# Patient Record
Sex: FEMALE | Race: WHITE
[De-identification: names, ages, dates, MRNs, and addresses within clinical notes are randomized per-mention and may not be internally consistent; named-entity substitution may affect disease eponyms.]

---

## 2022-08-19 ENCOUNTER — HOSPITAL ENCOUNTER (EMERGENCY)
Dept: HOSPITAL 76 - ED | Age: 52
Discharge: HOME | End: 2022-08-19
Payer: COMMERCIAL

## 2022-08-19 VITALS — SYSTOLIC BLOOD PRESSURE: 130 MMHG | DIASTOLIC BLOOD PRESSURE: 80 MMHG

## 2022-08-19 DIAGNOSIS — T81.49XA: Primary | ICD-10-CM

## 2022-08-19 LAB
ALBUMIN DIAFP-MCNC: 3.8 G/DL (ref 3.2–5.5)
ALBUMIN/GLOB SERPL: 1 {RATIO} (ref 1–2.2)
ALP SERPL-CCNC: 57 IU/L (ref 42–121)
ALT SERPL W P-5'-P-CCNC: 27 IU/L (ref 10–60)
ANION GAP SERPL CALCULATED.4IONS-SCNC: 11 MMOL/L (ref 6–13)
AST SERPL W P-5'-P-CCNC: 20 IU/L (ref 10–42)
BASOPHILS NFR BLD AUTO: 0.1 10^3/UL (ref 0–0.1)
BASOPHILS NFR BLD AUTO: 0.5 %
BILIRUB BLD-MCNC: 0.5 MG/DL (ref 0.2–1)
BUN SERPL-MCNC: 14 MG/DL (ref 6–20)
CALCIUM UR-MCNC: 9.4 MG/DL (ref 8.5–10.3)
CHLORIDE SERPL-SCNC: 105 MMOL/L (ref 101–111)
CK SERPL-CCNC: 42 IU/L (ref 22–269)
CO2 SERPL-SCNC: 24 MMOL/L (ref 21–32)
CREAT SERPLBLD-SCNC: 0.8 MG/DL (ref 0.4–1)
EOSINOPHIL # BLD AUTO: 0.2 10^3/UL (ref 0–0.7)
EOSINOPHIL NFR BLD AUTO: 1.3 %
ERYTHROCYTE [DISTWIDTH] IN BLOOD BY AUTOMATED COUNT: 15.2 % (ref 12–15)
GFRSERPLBLD MDRD-ARVRAT: 76 ML/MIN/{1.73_M2} (ref 89–?)
GLOBULIN SER-MCNC: 3.9 G/DL (ref 2.1–4.2)
GLUCOSE SERPL-MCNC: 115 MG/DL (ref 70–100)
HCT VFR BLD AUTO: 37.6 % (ref 37–47)
HGB UR QL STRIP: 12.2 G/DL (ref 12–16)
LIPASE SERPL-CCNC: 28 U/L (ref 22–51)
LYMPHOCYTES # SPEC AUTO: 1.6 10^3/UL (ref 1.5–3.5)
LYMPHOCYTES NFR BLD AUTO: 9.6 %
MCH RBC QN AUTO: 27.7 PG (ref 27–31)
MCHC RBC AUTO-ENTMCNC: 32.4 G/DL (ref 32–36)
MCV RBC AUTO: 85.5 FL (ref 81–99)
MONOCYTES # BLD AUTO: 1.2 10^3/UL (ref 0–1)
MONOCYTES NFR BLD AUTO: 7.2 %
NEUTROPHILS # BLD AUTO: 13.3 10^3/UL (ref 1.5–6.6)
NEUTROPHILS # SNV AUTO: 16.6 X10^3/UL (ref 4.8–10.8)
NEUTROPHILS NFR BLD AUTO: 80 %
NRBC # BLD AUTO: 0 /100WBC
NRBC # BLD AUTO: 0 X10^3/UL
PDW BLD AUTO: 11.1 FL (ref 7.9–10.8)
PLATELET # BLD: 375 10^3/UL (ref 130–450)
POTASSIUM SERPL-SCNC: 4.1 MMOL/L (ref 3.5–5)
PROT SPEC-MCNC: 7.7 G/DL (ref 6.7–8.2)
RBC MAR: 4.4 10^6/UL (ref 4.2–5.4)
SODIUM SERPLBLD-SCNC: 140 MMOL/L (ref 135–145)

## 2022-08-19 PROCEDURE — 99283 EMERGENCY DEPT VISIT LOW MDM: CPT

## 2022-08-19 PROCEDURE — 82550 ASSAY OF CK (CPK): CPT

## 2022-08-19 PROCEDURE — 83690 ASSAY OF LIPASE: CPT

## 2022-08-19 PROCEDURE — 36415 COLL VENOUS BLD VENIPUNCTURE: CPT

## 2022-08-19 PROCEDURE — 96366 THER/PROPH/DIAG IV INF ADDON: CPT

## 2022-08-19 PROCEDURE — 80053 COMPREHEN METABOLIC PANEL: CPT

## 2022-08-19 PROCEDURE — 10060 I&D ABSCESS SIMPLE/SINGLE: CPT

## 2022-08-19 PROCEDURE — 74176 CT ABD & PELVIS W/O CONTRAST: CPT

## 2022-08-19 PROCEDURE — 96365 THER/PROPH/DIAG IV INF INIT: CPT

## 2022-08-19 PROCEDURE — 83605 ASSAY OF LACTIC ACID: CPT

## 2022-08-19 PROCEDURE — 85025 COMPLETE CBC W/AUTO DIFF WBC: CPT

## 2022-08-19 PROCEDURE — 99284 EMERGENCY DEPT VISIT MOD MDM: CPT

## 2022-08-19 NOTE — EXTERNAL MEDICAL SUMMARY RPT
Continuity of Care Document

                           Created on:2022



Patient:TAHIR DOHERTY

Sex:Female

:1970

External Reference #:[id number]





Demographics







                          Address                   [address] [place] [address]

 

                          Phone                     Unavailable

 

                          Preferred Language        Unknown

 

                          Marital Status            Unknown

 

                          Buddhism Affiliation     Unknown

 

                          Race                      Unknown

 

                          Ethnic Group              Unknown









Author







                          Organization              [facility]

 

                          Address                    [place] [address]

 

                          Phone                     [phone]









Allergies

No information.



Encounters

No information.



Functional Status

No information.



Immunizations

No information.



Medications







                     date                description         facility

 

                     [id number]  tramadol            [facility]

[place]

 

                     [id number]+0000  propranolol         [facility]

[place]

 

                     [id number]  sulfamethoxazole-trimethoprim  [facility]

[place]

 

                     [id number]  tramadol            [facility]

[place]

 

                     [id number]  sulfamethoxazole-trimethoprim  [facility]

[place]

 

                     [id number]  propranolol         [facility]

[place]







Problems

No information.



Procedures







                     date                description         facility

 

                     [id number]+0000  Visit Code Hold     [facility]



                                                            Services [place]

 

                     [id number]  Visit Code Hold     [facility]



                                                            Services [place]







Results/Labs

No information.



Social History







                     date                description         facility

 

                     [id number]+0000  Never smoker        [facility]



                                                            [facility] [place]

 

                     [id number]+0000  Never smoker        [facility]



                                                            [facility] [place]







Vital Signs







                 date            measurement     value           units









              [id number]+0000  BMI          BMI          39.52        kg/m2

 

              [id number]+0000  BP_diastolic  BP_diastolic  89           mm[H

g]

 

              [id number]+0000  BP_systolic  BP_systolic  146          mm[Hg]

 

              [id number]+0000  heart_rate   heart_rate   114          /min

 

              [id number]+0000  height_metric  height_metric  167.64       cm

 

              [id number]+0000  height_standard  height_standard  66         

  in

 

              [id number]+0000  respiration_rate  respiration_rate  16       

    /min

 

              [id number]+0000  temperature_metric  temperature_metric  36.33

        C

 

              [id number]+0000  temperature_standard  temperature_standard  9

7.4         F

 

              [id number]+0000  weight_metric  weight_metric  110.68       kg

 

              [id number]+0000  weight_standard  weight_standard  244        

  lb

 

              [id number]+0000  BMI          BMI          39.43        kg/m2

 

              73129320135847+0000  BP_diastolic  BP_diastolic  64           mm[H

g]

 

              30392741303365+0000  BP_systolic  BP_systolic  108          mm[Hg]

 

              34318551745304+0000  heart_rate   heart_rate   88           /min

 

              61079733298138+0000  height_metric  height_metric  167.64       cm

 

              59649634791214+0000  height_standard  height_standard  66         

  in

 

              83194855773075+0000  respiration_rate  respiration_rate  16       

    /min

 

              09642160279517+0000  temperature_metric  temperature_metric  37.17

        C

 

              [id number]+0000  temperature_standard  temperature_standard  9

8.9         F

 

              39463764816829+0000  weight_metric  weight_metric  110.4        kg

 

              [id number]+0000  weight_standard  weight_standard  243.4      

  lb

## 2022-08-19 NOTE — ED PHYSICIAN DOCUMENTATION
PD HPI SKIN





- Stated complaint


Stated Complaint: ABSCESS





- Chief complaint


Chief Complaint: Wound





- History obtained from


History obtained from: Patient





- History of Present Illness


Timing - onset: How many days ago (2-3)


Timing - duration: Days


Timing - details: Abrupt onset, Still present


Location: Abdomen


Quality / character: Painful, Discolored (red), Swelling, Draining (was not 

draining initially and seen in [facility], with I&D done, Rx Bactrim and given 

"shot of antibiotics" in clinic. Back to [facility] yesterday for packing change. 

She states still having redness and some drainage, but tender swelling persists 

below the I&D spot.)


Associated symptoms: No: Fever, Myalgias, N/V/D


Contributing factors: Unknown


Recently seen: Clinic (Walk [facility] clinic 2 days ago with I&D and yesterday with 

packing change.)





PD PAST MEDICAL HISTORY





- Allergies


Allergies/Adverse Reactions: 


                                    Allergies











Allergy/AdvReac Type Severity Reaction Status Date / Time


 


No Known Drug Allergies Allergy   Verified 08/19/22 08:50














Results





- Vitals


Vitals: 


                               Vital Signs - 24 hr











  08/19/22 08/19/22





  08:48 13:38


 


Temperature 36.0 C L 36.5 C


 


Heart Rate 88 80


 


Respiratory 16 16





Rate  


 


Blood Pressure 149/82 H 130/80


 


O2 Saturation 99 98








                                     Oxygen











O2 Source                      Room air

















- Labs


Labs: 


                                Laboratory Tests











  08/19/22 08/19/22 08/19/22





  10:27 10:27 10:27


 


WBC  16.6 H  


 


RBC  4.40  


 


Hgb  12.2  


 


Hct  37.6  


 


MCV  85.5  


 


MCH  27.7  


 


MCHC  32.4  


 


RDW  15.2 H  


 


Plt Count  375  


 


MPV  11.1 H  


 


Neut # (Auto)  13.3 H  


 


Lymph # (Auto)  1.6  


 


Mono # (Auto)  1.2 H  


 


Eos # (Auto)  0.2  


 


Baso # (Auto)  0.1  


 


Absolute Nucleated RBC  0.00  


 


Nucleated RBC %  0.0  


 


Sodium   140 


 


Potassium   4.1 


 


Chloride   105 


 


Carbon Dioxide   24 


 


Anion Gap   11.0 


 


BUN   14 


 


Creatinine   0.8 


 


Estimated GFR (MDRD)   76 L 


 


Glucose   115 H 


 


Lactic Acid    0.9


 


Calcium   9.4 


 


Total Bilirubin   0.5 


 


AST   20 


 


ALT   27 


 


Alkaline Phosphatase   57 


 


Total Creatine Kinase   42 


 


Total Protein   7.7 


 


Albumin   3.8 


 


Globulin   3.9 


 


Albumin/Globulin Ratio   1.0 


 


Lipase   28 














- Rads (name of study)


  ** abd/pelvic CT


Radiology: Prelim report reviewed (induration soft tissue. Local fluid 

collection 2-3 cm size. ), See rad report





Procedures





- Abscess I&D (location)


  ** lower abd pannus


Preparation: Lidocaine 1%, With epi, Other (confirmed with CT)


Incision: Incised with scalpel, Purulent drainage


Other: Pt tolerated well, Other (she was already on Bactrim for past 1 1/2 days.

Given dose of Vanco here to supplement.)





PD MEDICAL DECISION MAKING





- ED course


Complexity details: reviewed results (CT showing fluid collection about 2-3 cm 

near area of incision. ), re-evaluated patient (had persistent fluid collection 

so did second I&D and got purulence out separate area. ), considered 

differential (has abscess that is still draining and with persistent surround 

redness and tenderness. CT done and showed persistent fluid collection. Initial 

culture was showing Staph. by time of end of ER visit, sensitivity report out 

and sensitive to Bactrim and vanco.), d/w patient


ED course: 





there is a lot of packing in the wound that was I&D'd. I removed it as I felt 

too much packing to allow drainage. More purulence out after packing removal. 

There is still area of firm/swelling below and midline of that, with CT showing 

persistent fluid collection, so second I&D done with more drainage. I believe 

with that as well as continued abx orally (since only on it for 1 1/2 days so 

far) the infection with improve from here. 





Departure





- Departure


Disposition: 01 Home, Self Care


Clinical Impression: 


 Abscess re-check





Condition: Stable


Record reviewed to determine appropriate education?: Yes


Instructions:  ED Abscess IandD


Follow-Up: 


JENELLE MERCHANT III, MD [Primary Care Provider] - 


Comments: 


Warm moist compresses or soaks to the area to help promote drainage further.  

Soft palpation around the incision sites to promote drainage periodically as 

well.





The culture result actually did result just an hour or so ago showing 

methicillin-resistant staph aureus which is a likely expected because for an 

abscess like this.  It did show sensitivity to sulfa medications like the one 

you are prescribed.





We did give an added dose of IV antibiotic vancomycin here to help bolster that.





We did an second drainage site to promote drainage that was still seen localized

on the CT scan.





The CT scan did not show any deep areas of abscess or inflammation.





At this point I would continue with the previous antibiotic and see how it does 

over the next several days.  Return if or follow-up with your primary care if 

persistent worsening or general symptoms such as fever vomiting etc.


Discharge Date/Time: 08/19/22 13:38

## 2022-08-19 NOTE — CT REPORT
PROCEDURE:  Abdomen/Pelvis WO

 

INDICATIONS:  lower abd abscess worsening

 

TECHNIQUE:  

Noncontrast 5 mm thick sections acquired from the diaphragms to the symphysis.  5 mm coronal and sagi
ttal reformats were then performed.  For radiation dose reduction, the following was used:  automated
 exposure control, adjustment of mA and/or kV according to patient size.

 

COMPARISON:  None.

 

FINDINGS:  

Image quality:  Excellent.  

 

ABDOMEN:  

Lung bases:  Lung bases are clear.  Heart size is normal.  

 

Solid organs:  Diffuse fatty liver infiltration can be seen.  The liver demonstrates normal size and 
demonstrates no focal lesions. The spleen demonstrates normal size and demonstrates no suspicious les
ions.  Gallbladder wall does not appear thickened.    Pancreas is normal in contours.  No adrenal nod
ules.  Kidneys are normal in size, without hydronephrosis or nephrolithiasis.  

 

Peritoneum and bowel:  Unenhanced bowel loops demonstrate normal wall thickness and caliber.  No free
 fluid or air.  

 

Nodes and vessels:  No retroperitoneal or mesenteric adenopathy by size criteria.  Aorta and inferior
 vena cava are normal in caliber.  

 

Miscellaneous:  No ventral hernias.  

 

 

PELVIS:  

Genitourinary:  Bladder wall thickness is normal.  The uterus demonstrates an unremarkable appearance
 for age. No adnexal masses are seen.

 

Miscellaneous:  No inguinal hernias or adenopathy.  However, borderline prominent groin lymph nodes c
an be seen.

 

Within the anterior wall of the pelvis, there is a focal collection of fluid and gas seen on the righ
t. The pocket of fluid measures up to 2 cm, as seen on series 3 image 94 and on series 6 image 18. Th
ere is a connection to the skin. Generalized soft tissue cellulitis can be seen. Bones:  No suspiciou
s bony lesions.  No vertebral body compression fractures.  

 

 

 

IMPRESSION:  

 

Partially decompressed soft tissue abscess seen involving the anterior right pelvic wall, with surrou
nding cellulitis. 

 

Associated borderline prominent groin lymph nodes can be seen.

 

 

Incidental note is made of:

Fatty liver infiltration

 

Reviewed by: Jaxson Estrada MD on 8/19/2022 9:37 AM SOL

Approved by: Jaxson Estrada MD on 8/19/2022 9:37 AM SOL

 

 

Station ID:  [id number]